# Patient Record
Sex: MALE | Race: WHITE | NOT HISPANIC OR LATINO | Employment: PART TIME | URBAN - METROPOLITAN AREA
[De-identification: names, ages, dates, MRNs, and addresses within clinical notes are randomized per-mention and may not be internally consistent; named-entity substitution may affect disease eponyms.]

---

## 2018-08-03 ENCOUNTER — TELEPHONE (OUTPATIENT)
Dept: PRIMARY CARE CLINIC | Facility: CLINIC | Age: 28
End: 2018-08-03

## 2018-08-03 ENCOUNTER — LAB VISIT (OUTPATIENT)
Dept: LAB | Facility: HOSPITAL | Age: 28
End: 2018-08-03
Attending: INTERNAL MEDICINE
Payer: COMMERCIAL

## 2018-08-03 ENCOUNTER — OFFICE VISIT (OUTPATIENT)
Dept: PRIMARY CARE CLINIC | Facility: CLINIC | Age: 28
End: 2018-08-03
Payer: COMMERCIAL

## 2018-08-03 VITALS
BODY MASS INDEX: 19.88 KG/M2 | SYSTOLIC BLOOD PRESSURE: 132 MMHG | WEIGHT: 150 LBS | OXYGEN SATURATION: 99 % | HEART RATE: 77 BPM | HEIGHT: 73 IN | DIASTOLIC BLOOD PRESSURE: 83 MMHG | TEMPERATURE: 98 F

## 2018-08-03 DIAGNOSIS — Z11.3 SCREEN FOR STD (SEXUALLY TRANSMITTED DISEASE): ICD-10-CM

## 2018-08-03 DIAGNOSIS — H69.03 PATULOUS EUSTACHIAN TUBE OF BOTH EARS: ICD-10-CM

## 2018-08-03 DIAGNOSIS — Z23 NEED FOR TDAP VACCINATION: ICD-10-CM

## 2018-08-03 DIAGNOSIS — Q67.6 PECTUS EXCAVATUM: ICD-10-CM

## 2018-08-03 DIAGNOSIS — R07.89 ATYPICAL CHEST PAIN: ICD-10-CM

## 2018-08-03 DIAGNOSIS — Z00.00 ENCOUNTER FOR WELLNESS EXAMINATION IN ADULT: ICD-10-CM

## 2018-08-03 DIAGNOSIS — Z00.00 ENCOUNTER FOR WELLNESS EXAMINATION IN ADULT: Primary | ICD-10-CM

## 2018-08-03 LAB
25(OH)D3+25(OH)D2 SERPL-MCNC: 37 NG/ML
ALBUMIN SERPL BCP-MCNC: 4.2 G/DL
ALP SERPL-CCNC: 73 U/L
ALT SERPL W/O P-5'-P-CCNC: 14 U/L
ANION GAP SERPL CALC-SCNC: 8 MMOL/L
AST SERPL-CCNC: 16 U/L
BASOPHILS # BLD AUTO: 0.05 K/UL
BASOPHILS NFR BLD: 0.9 %
BILIRUB SERPL-MCNC: 1.2 MG/DL
BUN SERPL-MCNC: 14 MG/DL
CALCIUM SERPL-MCNC: 10 MG/DL
CHLORIDE SERPL-SCNC: 102 MMOL/L
CO2 SERPL-SCNC: 29 MMOL/L
CREAT SERPL-MCNC: 1 MG/DL
D DIMER PPP IA.FEU-MCNC: <0.19 MG/L FEU
DIFFERENTIAL METHOD: ABNORMAL
EOSINOPHIL # BLD AUTO: 0.1 K/UL
EOSINOPHIL NFR BLD: 2.4 %
ERYTHROCYTE [DISTWIDTH] IN BLOOD BY AUTOMATED COUNT: 11.7 %
EST. GFR  (AFRICAN AMERICAN): >60 ML/MIN/1.73 M^2
EST. GFR  (NON AFRICAN AMERICAN): >60 ML/MIN/1.73 M^2
GLUCOSE SERPL-MCNC: 73 MG/DL
HCT VFR BLD AUTO: 46 %
HGB BLD-MCNC: 15.6 G/DL
IMM GRANULOCYTES # BLD AUTO: 0.04 K/UL
IMM GRANULOCYTES NFR BLD AUTO: 0.7 %
LYMPHOCYTES # BLD AUTO: 2 K/UL
LYMPHOCYTES NFR BLD: 34.1 %
MCH RBC QN AUTO: 30 PG
MCHC RBC AUTO-ENTMCNC: 33.9 G/DL
MCV RBC AUTO: 89 FL
MONOCYTES # BLD AUTO: 0.7 K/UL
MONOCYTES NFR BLD: 11.4 %
NEUTROPHILS # BLD AUTO: 3 K/UL
NEUTROPHILS NFR BLD: 50.5 %
NRBC BLD-RTO: 0 /100 WBC
PLATELET # BLD AUTO: 330 K/UL
PMV BLD AUTO: 10.9 FL
POTASSIUM SERPL-SCNC: 3.5 MMOL/L
PROT SERPL-MCNC: 7.6 G/DL
RBC # BLD AUTO: 5.2 M/UL
SODIUM SERPL-SCNC: 139 MMOL/L
TSH SERPL DL<=0.005 MIU/L-ACNC: 2.17 UIU/ML
WBC # BLD AUTO: 5.87 K/UL

## 2018-08-03 PROCEDURE — 82306 VITAMIN D 25 HYDROXY: CPT

## 2018-08-03 PROCEDURE — 3008F BODY MASS INDEX DOCD: CPT | Mod: CPTII,S$GLB,, | Performed by: INTERNAL MEDICINE

## 2018-08-03 PROCEDURE — 99203 OFFICE O/P NEW LOW 30 MIN: CPT | Mod: 25,S$GLB,, | Performed by: INTERNAL MEDICINE

## 2018-08-03 PROCEDURE — 36415 COLL VENOUS BLD VENIPUNCTURE: CPT | Mod: PN

## 2018-08-03 PROCEDURE — 84443 ASSAY THYROID STIM HORMONE: CPT

## 2018-08-03 PROCEDURE — 86592 SYPHILIS TEST NON-TREP QUAL: CPT

## 2018-08-03 PROCEDURE — 85025 COMPLETE CBC W/AUTO DIFF WBC: CPT

## 2018-08-03 PROCEDURE — 99999 PR PBB SHADOW E&M-NEW PATIENT-LVL IV: CPT | Mod: PBBFAC,,, | Performed by: INTERNAL MEDICINE

## 2018-08-03 PROCEDURE — 86703 HIV-1/HIV-2 1 RESULT ANTBDY: CPT

## 2018-08-03 PROCEDURE — 90715 TDAP VACCINE 7 YRS/> IM: CPT | Mod: S$GLB,,, | Performed by: INTERNAL MEDICINE

## 2018-08-03 PROCEDURE — 85379 FIBRIN DEGRADATION QUANT: CPT

## 2018-08-03 PROCEDURE — 90471 IMMUNIZATION ADMIN: CPT | Mod: S$GLB,,, | Performed by: INTERNAL MEDICINE

## 2018-08-03 PROCEDURE — 80053 COMPREHEN METABOLIC PANEL: CPT

## 2018-08-03 PROCEDURE — 93005 ELECTROCARDIOGRAM TRACING: CPT | Mod: S$GLB,,, | Performed by: INTERNAL MEDICINE

## 2018-08-03 PROCEDURE — 93010 ELECTROCARDIOGRAM REPORT: CPT | Mod: ,,, | Performed by: INTERNAL MEDICINE

## 2018-08-03 NOTE — PROGRESS NOTES
Subjective:       Patient ID: Rogers Walters is a 28 y.o. male with a PMH significant for Patulous Eustachian Tube Dysfunction s/p bilateral eustachian tub placement in 6/2013 presents today to establish care.    Chief Complaint: Establish Care and Chest Pain    HPI   Having left upper chest pain described as sharp and intermittent.  Last was 1 week ago and before that was 6 months.  Initially had it 1 1/2 years ago.  No associated with exertion.  Not positional.  Lasted for 5 minutes.  Movement and inspiration make it work.  No orthopnea or PND.  No BELTRAN.  No associated N/V.  No LE edema.  No palpitations.  Patient denies f/c, n/v/d.  No chest pain or SOB.  No abdominal pain or dysuria.  No headaches or change in vision.  No dizziness.  No significant  weight gain or weight loss.  Remaining ROS negative.    Review of Systems   Constitutional: Negative for appetite change, diaphoresis, fatigue and unexpected weight change.   HENT: Negative for congestion, ear pain, hearing loss, rhinorrhea, sinus pressure, sore throat, trouble swallowing and voice change.    Eyes: Negative for photophobia, pain and visual disturbance.   Respiratory: Negative for cough, chest tightness, shortness of breath and wheezing.    Cardiovascular: Positive for chest pain. Negative for palpitations and leg swelling.   Gastrointestinal: Negative for abdominal pain, blood in stool, constipation, diarrhea, nausea and vomiting.   Endocrine: Negative for cold intolerance, heat intolerance, polydipsia, polyphagia and polyuria.   Genitourinary: Negative for decreased urine volume, difficulty urinating, discharge, dysuria, flank pain, hematuria, scrotal swelling, testicular pain and urgency.   Musculoskeletal: Negative for arthralgias, back pain, myalgias and neck pain.   Skin: Negative for rash.   Neurological: Negative for dizziness, tremors, syncope, weakness, numbness and headaches.   Hematological: Does not bruise/bleed easily.    Psychiatric/Behavioral: Negative for agitation, confusion and sleep disturbance. The patient is not nervous/anxious.        Objective:      Physical Exam   Constitutional: He is oriented to person, place, and time. He appears well-developed and well-nourished.   HENT:   Head: Normocephalic.   Nose: Nose normal.   Mouth/Throat: Oropharynx is clear and moist.   Eyes: Conjunctivae and EOM are normal. Pupils are equal, round, and reactive to light. Right eye exhibits no discharge. Left eye exhibits no discharge. No scleral icterus.   Neck: Normal range of motion. Neck supple. No thyromegaly present.   Cardiovascular: Normal rate, regular rhythm, normal heart sounds and intact distal pulses.  Exam reveals no gallop and no friction rub.    No murmur heard.  Pulmonary/Chest: Effort normal and breath sounds normal. No respiratory distress. He has no wheezes. He has no rales.   Abdominal: Soft. Bowel sounds are normal. He exhibits no distension. There is no tenderness. There is no rebound and no guarding.   Genitourinary: Rectum normal and penis normal.   Musculoskeletal: Normal range of motion. He exhibits no edema or deformity.   Lymphadenopathy:     He has no cervical adenopathy.   Neurological: He is alert and oriented to person, place, and time. No cranial nerve deficit or sensory deficit.   Skin: Skin is warm and dry. No rash noted. No erythema.   Psychiatric: He has a normal mood and affect. His behavior is normal. Thought content normal.       Assessment:       1. Encounter for wellness examination in adult    2. Patulous eustachian tube of both ears    3. Need for Tdap vaccination    4. Atypical chest pain    5. Pectus excavatum    6. Screen for STD (sexually transmitted disease)        Plan:    -Adult Wellness Exam - Patient overall stable with good BP today.  Will order routine fasting labs today.  Offered STD screening - will order today.    -Cards - Atypical Chest Pain - has Pectus Excavatum and unclear if  this is contributing to his symptoms.  VS are stable.  Cardiac exam normal.  Will check EKG, TTE, CXR, d-dimers (Uber ).    -ENT - Patulous Eustachian Tube Dysfunction s/p bilateral eustachian tub placement in 6/2013.  Last surgery done 10/2017.  No tubes now.    -HCM - Discussed Flu and Tdap (today) Vaccines.       -Follow up in 4 weeks to review testing.

## 2018-08-03 NOTE — PATIENT INSTRUCTIONS
Your exam was overall normal today.  Your blood pressure was good.  Your chest pain is Atypical, but given the fact you have Pectus Excavatum, I will order a few additional tests.  I will check an EKG today and Chest x-ray.  I will order an Echocardiogram to look at your heart function.    I will order routine fasting labs today - at least 6-8 hours of fasting.  You will need to return for the fasting lipids part of the tests.  We will do and STD screen for HIV 1/2, Syphilis, Gonorrhea and Chlamydia.  We will give you the Tdap vaccine today.  Return in 4 weeks - sooner if needed.  Please come at least 15-20 minutes before your scheduled appointment time.

## 2018-08-03 NOTE — PROGRESS NOTES
"Patient was given vaccine information sheet for the Tdap immunization. The area of injection was palpated using the acromion process as a landmark. This area was cleaned with alcohol. Using a 25g 1" safety needle, 0.5mL of the vaccine was placed into the Left Deltoid muscle. The injection site was dressed with a bandage. Patient experienced no complications and was discharged in stable condition. Tdap Lot: [id number] Exp: 2/6/20    "

## 2018-08-03 NOTE — TELEPHONE ENCOUNTER
lmovm letting pt know xray was normal call back with questions.    ----- Message from Higinio Curry MD sent at 8/3/2018  3:05 PM CDT -----  Please let patient know that his chest x-ray was normal.

## 2018-08-04 LAB — RPR SER QL: NORMAL

## 2018-08-06 ENCOUNTER — TELEPHONE (OUTPATIENT)
Dept: PRIMARY CARE CLINIC | Facility: CLINIC | Age: 28
End: 2018-08-06

## 2018-08-06 ENCOUNTER — HOSPITAL ENCOUNTER (OUTPATIENT)
Dept: CARDIOLOGY | Facility: OTHER | Age: 28
Discharge: HOME | End: 2018-08-06
Attending: INTERNAL MEDICINE
Payer: COMMERCIAL

## 2018-08-06 DIAGNOSIS — Q67.6 PECTUS EXCAVATUM: ICD-10-CM

## 2018-08-06 LAB — HIV 1+2 AB+HIV1 P24 AG SERPL QL IA: NEGATIVE

## 2018-08-06 PROCEDURE — 93306 TTE W/DOPPLER COMPLETE: CPT

## 2018-08-06 NOTE — TELEPHONE ENCOUNTER
Called pt, could not leave VM because mailbox is full    ----- Message from Higinio Curry MD sent at 8/6/2018 12:45 PM CDT -----  Please let patient know that his HIV test was negative

## 2018-08-07 ENCOUNTER — TELEPHONE (OUTPATIENT)
Dept: PRIMARY CARE CLINIC | Facility: CLINIC | Age: 28
End: 2018-08-07

## 2018-08-07 LAB
DIASTOLIC DYSFUNCTION: NO
ESTIMATED PA SYSTOLIC PRESSURE: 14.44
RETIRED EF AND QEF - SEE NOTES: 67 (ref 55–65)

## 2018-08-07 NOTE — TELEPHONE ENCOUNTER
----- Message from Higinio Curry MD sent at 8/7/2018  9:55 AM CDT -----  Please let patient know that his Echocardiogram was normal.

## 2018-08-17 PROBLEM — G50.1 ATYPICAL FACIAL PAIN: Status: ACTIVE | Noted: 2018-08-17

## 2018-08-17 PROBLEM — M26.609 TEMPOROMANDIBULAR JOINT DISORDER: Status: ACTIVE | Noted: 2018-08-17

## 2018-08-17 PROBLEM — H69.00 PATULOUS EUSTACHIAN TUBE: Status: ACTIVE | Noted: 2018-08-17

## 2018-08-30 NOTE — PROGRESS NOTES
Subjective:       Patient ID: Rogers Walters is a 28 y.o. male with a PMH significant for Patulous Eustachian Tube Dysfunction s/p bilateral eustachian tub placement in 6/2013 presents today to establish care.    Chief Complaint: Follow-up    HPI  Patient overall stable today.  We discussed recent labs and cardiac work-up.  No significant pain since last visit.  Patient denies f/c, n/v/d.  No chest pain or SOB.  No abdominal pain or dysuria.  No headaches or change in vision.  No dizziness.  No significant  weight gain or weight loss.  Remaining ROS negative.    Review of Systems   Constitutional: Negative for appetite change, diaphoresis, fatigue and unexpected weight change.   HENT: Negative for congestion, ear pain, hearing loss, rhinorrhea, sinus pressure, sore throat, trouble swallowing and voice change.    Eyes: Negative for photophobia, pain and visual disturbance.   Respiratory: Negative for cough, chest tightness, shortness of breath and wheezing.    Cardiovascular: Negative for chest pain, palpitations and leg swelling.   Gastrointestinal: Negative for abdominal pain, blood in stool, constipation, diarrhea, nausea and vomiting.   Endocrine: Negative for cold intolerance, heat intolerance, polydipsia, polyphagia and polyuria.   Genitourinary: Negative for decreased urine volume, difficulty urinating, discharge, dysuria, flank pain, hematuria, scrotal swelling, testicular pain and urgency.   Musculoskeletal: Negative for arthralgias, back pain, myalgias and neck pain.   Skin: Negative for rash.   Neurological: Negative for dizziness, tremors, syncope, weakness, numbness and headaches.   Hematological: Does not bruise/bleed easily.   Psychiatric/Behavioral: Negative for agitation, confusion and sleep disturbance. The patient is not nervous/anxious.        Objective:      Physical Exam   Constitutional: He is oriented to person, place, and time. He appears well-developed and well-nourished.   HENT:   Head:  Normocephalic.   Nose: Nose normal.   Mouth/Throat: Oropharynx is clear and moist.   Eyes: Conjunctivae and EOM are normal. Pupils are equal, round, and reactive to light. Right eye exhibits no discharge. Left eye exhibits no discharge. No scleral icterus.   Neck: Normal range of motion. Neck supple. No thyromegaly present.   Cardiovascular: Normal rate, regular rhythm, normal heart sounds and intact distal pulses. Exam reveals no gallop and no friction rub.   No murmur heard.  Pulmonary/Chest: Effort normal and breath sounds normal. No respiratory distress. He has no wheezes. He has no rales.   Abdominal: Soft. Bowel sounds are normal. He exhibits no distension. There is no tenderness. There is no rebound and no guarding.   Genitourinary:   Genitourinary Comments: deferred   Musculoskeletal: Normal range of motion. He exhibits no edema or deformity.   Lymphadenopathy:     He has no cervical adenopathy.   Neurological: He is alert and oriented to person, place, and time. No cranial nerve deficit or sensory deficit.   Skin: Skin is warm and dry. No rash noted. No erythema.   Psychiatric: He has a normal mood and affect. His behavior is normal. Thought content normal.       Assessment:       1. Atypical chest pain    2. Pectus excavatum        Plan:    -Today's Visit - patient overall stable.    -Cards - Atypical Chest Pain last visit - has Pectus Excavatum and was unclear if this was contributing to his symptoms.  VS were stable.  Cardiac exam was normal.  No FH of early CAD.   EKG with NSR and possible LAE.  TTE was normal.  CXR normal.  D-dimers (Uber ) negative.   Lipids not yet done - check when fasting.    -ENT - Patulous Eustachian Tube Dysfunction s/p bilateral eustachian tub placement in 6/2013.  Last surgery done 10/2017.  No tubes now.    -HCM - Discussed Flu (his dad is an Ophthalmologist and will give him the Flu vaccine) and Tdap (8/3/2018) Vaccines.   STD screen in 8/2018 negative for HIV 1/2,  Syphilis, GC/Chlamydia.  Vitamin D level normal in 8/2018.  TSH normal in 8/2018.     -Follow up as needed

## 2018-08-31 ENCOUNTER — OFFICE VISIT (OUTPATIENT)
Dept: PRIMARY CARE CLINIC | Facility: CLINIC | Age: 28
End: 2018-08-31
Payer: COMMERCIAL

## 2018-08-31 VITALS
SYSTOLIC BLOOD PRESSURE: 112 MMHG | HEIGHT: 73 IN | TEMPERATURE: 97 F | BODY MASS INDEX: 19.93 KG/M2 | WEIGHT: 150.38 LBS | HEART RATE: 79 BPM | DIASTOLIC BLOOD PRESSURE: 72 MMHG | OXYGEN SATURATION: 99 %

## 2018-08-31 DIAGNOSIS — Q67.6 PECTUS EXCAVATUM: ICD-10-CM

## 2018-08-31 DIAGNOSIS — R07.89 ATYPICAL CHEST PAIN: Primary | ICD-10-CM

## 2018-08-31 PROCEDURE — 99999 PR PBB SHADOW E&M-EST. PATIENT-LVL III: CPT | Mod: PBBFAC,,, | Performed by: INTERNAL MEDICINE

## 2018-08-31 PROCEDURE — 3008F BODY MASS INDEX DOCD: CPT | Mod: CPTII,S$GLB,, | Performed by: INTERNAL MEDICINE

## 2018-08-31 PROCEDURE — 99213 OFFICE O/P EST LOW 20 MIN: CPT | Mod: S$GLB,,, | Performed by: INTERNAL MEDICINE

## 2018-08-31 NOTE — PATIENT INSTRUCTIONS
Your exam was overall normal today.  Your blood pressure was good.  I will order routine fasting labs today - at least 6-8 hours of fasting.  We discussed the Flu and Tdap vaccinations.  Return in 1 year - sooner if needed.  Please come at least 15-20 minutes before your scheduled appointment time.

## 2019-10-29 DIAGNOSIS — J32.1 FRONTAL SINUSITIS: ICD-10-CM

## 2019-10-29 DIAGNOSIS — J32.3 CHRONIC SPHENOIDAL SINUSITIS: Primary | ICD-10-CM

## 2019-10-29 DIAGNOSIS — H69.03 PATULOUS EUSTACHIAN TUBE, BILATERAL: ICD-10-CM

## 2019-10-29 DIAGNOSIS — J32.2 CHRONIC ETHMOIDAL SINUSITIS: ICD-10-CM

## 2019-10-29 DIAGNOSIS — H93.13 TINNITUS, BILATERAL: ICD-10-CM

## 2019-10-29 DIAGNOSIS — H69.83 OTHER SPECIFIED DISORDERS OF EUSTACHIAN TUBE, BILATERAL: ICD-10-CM

## 2019-10-29 DIAGNOSIS — J32.0 CHRONIC MAXILLARY SINUSITIS: ICD-10-CM

## 2019-11-13 ENCOUNTER — HOSPITAL ENCOUNTER (OUTPATIENT)
Dept: RADIOLOGY | Facility: HOSPITAL | Age: 29
Discharge: HOME | End: 2019-11-13
Attending: OTOLARYNGOLOGY
Payer: COMMERCIAL

## 2019-11-13 DIAGNOSIS — J32.0 CHRONIC MAXILLARY SINUSITIS: ICD-10-CM

## 2019-11-13 DIAGNOSIS — H69.03 PATULOUS EUSTACHIAN TUBE, BILATERAL: ICD-10-CM

## 2019-11-13 DIAGNOSIS — J32.3 CHRONIC SPHENOIDAL SINUSITIS: ICD-10-CM

## 2019-11-13 DIAGNOSIS — J32.1 FRONTAL SINUSITIS: ICD-10-CM

## 2019-11-13 DIAGNOSIS — J32.2 CHRONIC ETHMOIDAL SINUSITIS: ICD-10-CM

## 2019-11-13 DIAGNOSIS — H93.13 TINNITUS, BILATERAL: ICD-10-CM

## 2019-11-13 DIAGNOSIS — H69.83 OTHER SPECIFIED DISORDERS OF EUSTACHIAN TUBE, BILATERAL: ICD-10-CM

## 2019-11-13 PROCEDURE — 70480 CT ORBIT/EAR/FOSSA W/O DYE: CPT | Mod: TC

## 2019-11-13 PROCEDURE — 70480 CT ORBIT/EAR/FOSSA W/O DYE: CPT | Mod: 26,,, | Performed by: RADIOLOGY

## 2019-11-13 PROCEDURE — 70486 CT MAXILLOFACIAL W/O DYE: CPT | Mod: 26,,, | Performed by: RADIOLOGY

## 2019-11-13 PROCEDURE — 70480 CT ORBITS SELLA POST FOSSA WITHOUT CONT: ICD-10-PCS | Mod: 26,,, | Performed by: RADIOLOGY

## 2019-11-13 PROCEDURE — 70486 CT SINUSES WITHOUT CONTRAST: ICD-10-PCS | Mod: 26,,, | Performed by: RADIOLOGY

## 2019-11-13 PROCEDURE — 70486 CT MAXILLOFACIAL W/O DYE: CPT | Mod: TC

## 2020-02-21 ENCOUNTER — TELEPHONE (OUTPATIENT)
Dept: INTERNAL MEDICINE | Facility: CLINIC | Age: 30
End: 2020-02-21

## 2020-02-21 NOTE — TELEPHONE ENCOUNTER
Attempted to call patient to re-establish care with a new PCP but mailbox is full and can't accept new messages.

## 2020-05-21 ENCOUNTER — TELEPHONE (OUTPATIENT)
Dept: OTOLARYNGOLOGY | Facility: CLINIC | Age: 30
End: 2020-05-21

## 2020-05-25 ENCOUNTER — TELEPHONE (OUTPATIENT)
Dept: OTOLARYNGOLOGY | Facility: CLINIC | Age: 30
End: 2020-05-25

## 2020-05-26 ENCOUNTER — TELEPHONE (OUTPATIENT)
Dept: OTOLARYNGOLOGY | Facility: CLINIC | Age: 30
End: 2020-05-26

## 2020-07-02 ENCOUNTER — TELEPHONE (OUTPATIENT)
Dept: INTERNAL MEDICINE | Facility: CLINIC | Age: 30
End: 2020-07-02

## 2020-07-28 ENCOUNTER — CLINICAL SUPPORT (OUTPATIENT)
Dept: AUDIOLOGY | Facility: CLINIC | Age: 30
End: 2020-07-28
Payer: COMMERCIAL

## 2020-07-28 ENCOUNTER — OFFICE VISIT (OUTPATIENT)
Dept: OTOLARYNGOLOGY | Facility: CLINIC | Age: 30
End: 2020-07-28
Payer: COMMERCIAL

## 2020-07-28 VITALS
WEIGHT: 144.19 LBS | SYSTOLIC BLOOD PRESSURE: 135 MMHG | DIASTOLIC BLOOD PRESSURE: 87 MMHG | BODY MASS INDEX: 19.02 KG/M2 | HEART RATE: 67 BPM

## 2020-07-28 DIAGNOSIS — M26.609 TEMPOROMANDIBULAR JOINT DISORDER: ICD-10-CM

## 2020-07-28 DIAGNOSIS — H69.03 PATULOUS EUSTACHIAN TUBE OF BOTH EARS: Primary | ICD-10-CM

## 2020-07-28 DIAGNOSIS — H69.00 PATULOUS EUSTACHIAN TUBE, UNSPECIFIED LATERALITY: ICD-10-CM

## 2020-07-28 DIAGNOSIS — H69.92 DYSFUNCTION OF LEFT EUSTACHIAN TUBE: Primary | ICD-10-CM

## 2020-07-28 DIAGNOSIS — H69.00 PATULOUS EUSTACHIAN TUBE, UNSPECIFIED LATERALITY: Primary | ICD-10-CM

## 2020-07-28 PROCEDURE — 92567 PR TYMPA2METRY: ICD-10-PCS | Mod: S$GLB,,, | Performed by: AUDIOLOGIST

## 2020-07-28 PROCEDURE — 92567 TYMPANOMETRY: CPT | Mod: S$GLB,,, | Performed by: AUDIOLOGIST

## 2020-07-28 PROCEDURE — 99204 OFFICE O/P NEW MOD 45 MIN: CPT | Mod: S$GLB,,, | Performed by: OTOLARYNGOLOGY

## 2020-07-28 PROCEDURE — 99999 PR PBB SHADOW E&M-EST. PATIENT-LVL III: CPT | Mod: PBBFAC,,, | Performed by: OTOLARYNGOLOGY

## 2020-07-28 PROCEDURE — 99999 PR PBB SHADOW E&M-EST. PATIENT-LVL II: ICD-10-PCS | Mod: PBBFAC,,, | Performed by: AUDIOLOGIST

## 2020-07-28 PROCEDURE — 99204 PR OFFICE/OUTPT VISIT, NEW, LEVL IV, 45-59 MIN: ICD-10-PCS | Mod: S$GLB,,, | Performed by: OTOLARYNGOLOGY

## 2020-07-28 PROCEDURE — 99999 PR PBB SHADOW E&M-EST. PATIENT-LVL III: ICD-10-PCS | Mod: PBBFAC,,, | Performed by: OTOLARYNGOLOGY

## 2020-07-28 PROCEDURE — 99999 PR PBB SHADOW E&M-EST. PATIENT-LVL II: CPT | Mod: PBBFAC,,, | Performed by: AUDIOLOGIST

## 2020-07-28 PROCEDURE — 3008F PR BODY MASS INDEX (BMI) DOCUMENTED: ICD-10-PCS | Mod: CPTII,S$GLB,, | Performed by: OTOLARYNGOLOGY

## 2020-07-28 PROCEDURE — 3008F BODY MASS INDEX DOCD: CPT | Mod: CPTII,S$GLB,, | Performed by: OTOLARYNGOLOGY

## 2020-07-28 RX ORDER — PREGABALIN 100 MG/1
100 CAPSULE ORAL 2 TIMES DAILY
COMMUNITY

## 2020-07-29 NOTE — PROGRESS NOTES
Subjective:      Rogers Walters is a 30 y.o. male who was self-referred for patulous Eustachian tube dysfunction.    He relates an exhaustive history of classical patulous ETD symptoms that began nearly 10 years ago of unclear origin.  He notes that this has always been worse on the left side, though over time the right ear has become variable involved.  He describes initial treatment by Dr. Brandon Millan, who performed a cheryl insertion in both Eustachian tubes over 8 years ago which was temporarily successful but marred by persistent aural fullness.  After treatment with myringotomy the shims were eventually removed.  He then came under the care of Dr. Roby Short in [place], who again performed a cheryl procedure with some adjunctive manipulation in the left ear, but the beneficial results again were transient.  Along the way he has tried topical medical treatments with Patulend and premarin drops.  Most recently he has been under the care of Dr. Kong Sales in [place], who about 2 years ago performed a cartilage graft insertion in the left peritubular space, which helped somewhat.  This seemed to have the effects of malpositioning and so was treated with attempted crushing by balloon dilation, but the symptoms persisted.  He also underwent reportedly 3 rounds of botox injection in the left tensor veli palatini in an attempt to debulk the muscle by chemical denervation.  He most recently had an examination under anesthesia by Dr. Sales about 1 month ago, which did not entail intervention.  He has reportedly proposed an endoscopic procedure to adjust the positioning of the cartilage graft, but is seeking a second opinion close to home due to the pandemic.    He continues to describe variable symptoms, which are unpredictable but seem to be more dynamic in nature.  He describes autophony, audible respirations, and aural fullness, greater on the left side.  He notes sporadic fluctuations in the function of the  left-sided peritubular muscles, which he associates temporally with muscle tension in his posterior neck.  He denies TMJ tenderness, clicking, trismus or grinding.  He denies objective hearing loss or vertigo.  He denies sinonasal symptoms or hyposmia.  He does note that over the past 1-2 months his aural symptoms are showing a gradual trend toward improvement, though this is subtle.    Current sinonasal medications include none at present.  The last course of antibiotics was a long time ago.  He does not regularly use nasal decongestant sprays.    He does not recall previously having allergy testing.    He denies a history of asthma.    He denies a history of reflux symptoms.  He has not previously had an EGD.    He denies a diagnosis of obstructive sleep apnea.     He has previously had sinonasal surgery as above.  He has had a tonsillectomy about 5 years ago, which did not help the aural symptoms.    He does not recall a prior history of nasal trauma other than the sinonasal surgery.        Past Medical History  He has no past medical history on file.    Past Surgical History  He has a past surgical history that includes Inner ear surgery and Inner ear surgery.    Family History  His family history includes No Known Problems in his brother, father, mother, and sister.    Social History  He reports that he has never smoked. He has never used smokeless tobacco. He reports current alcohol use. He reports current drug use. Drug: Marijuana.    Allergies  He has No Known Allergies.    Medications   He has a current medication list which includes the following prescription(s): pregabalin.    Review of Systems  Review of Systems   Constitutional: Negative for fatigue, fever and unexpected weight change.   HENT: Negative for congestion, dental problem, ear discharge, ear pain, facial swelling, hearing loss, hoarse voice, nosebleeds, postnasal drip, rhinorrhea, sinus pressure, sore throat, tinnitus, trouble swallowing and  voice change.    Eyes: Negative for photophobia, discharge, itching and visual disturbance.   Respiratory: Negative for apnea, cough, shortness of breath and wheezing.    Cardiovascular: Negative for chest pain and palpitations.   Gastrointestinal: Negative for abdominal pain, nausea and vomiting.   Endocrine: Negative for cold intolerance and heat intolerance.   Genitourinary: Negative for difficulty urinating.   Musculoskeletal: Negative for arthralgias, back pain, myalgias and neck pain.   Skin: Negative for rash.   Allergic/Immunologic: Negative for environmental allergies and food allergies.   Neurological: Negative for dizziness, seizures, syncope, weakness and headaches.   Hematological: Negative for adenopathy. Does not bruise/bleed easily.   Psychiatric/Behavioral: Negative for decreased concentration, dysphoric mood and sleep disturbance. The patient is not nervous/anxious.           Objective:     /87   Pulse 67   Wt 65.4 kg (144 lb 2.9 oz)   BMI 19.02 kg/m²        Constitutional:   He appears well-developed. He is cooperative. Normal speech.  No hoarse voice.      Head:  Normocephalic. Salivary glands normal.  Facial strength is normal.      Ears:    Right Ear: No drainage or tenderness. Tympanic membrane is not perforated. Tympanic membrane mobility is normal. No middle ear effusion. No decreased hearing is noted.   Left Ear: No drainage or tenderness. Tympanic membrane is not perforated. Tympanic membrane mobility is normal.  No middle ear effusion. No decreased hearing is noted.   Positive ipsilateral breathing test with visible TM excursion bilaterally    Nose:  No mucosal edema, rhinorrhea, septal deviation or polyps. No epistaxis. Turbinates normal, no turbinate masses and no turbinate hypertrophy.  Right sinus exhibits no maxillary sinus tenderness and no frontal sinus tenderness. Left sinus exhibits no maxillary sinus tenderness and no frontal sinus tenderness.      Mouth/Throat  Oropharynx clear and moist without lesions or asymmetry and normal uvula midline. He does not have dentures. Normal dentition. No oral lesions or mucous membrane lesions. No oropharyngeal exudate or posterior oropharyngeal erythema. Mirror exam not performed due to patient tolerance.  Mirror exam not performed due to patient tolerance.      Neck:  Neck normal without thyromegaly masses, asymmetry, normal tracheal structure, crepitus, and tenderness, thyroid normal, trachea normal and no adenopathy. Normal range of motion present.     He has no cervical adenopathy.     Cardiovascular:   Regular rhythm.      Pulmonary/Chest:   Effort normal.     Psychiatric:   He has a normal mood and affect. His speech is normal and behavior is normal.     Neurological:   No cranial nerve deficit.     Skin:   No rash noted.       Procedure    None        Data Reviewed    WBC (K/uL)   Date Value   08/03/2018 5.87     Eosinophil% (%)   Date Value   08/03/2018 2.4     Eos # (K/uL)   Date Value   08/03/2018 0.1     Platelets (K/uL)   Date Value   08/03/2018 330     Glucose (mg/dL)   Date Value   08/03/2018 73     No results found for: IGE    No sinus imaging available.     I independently reviewed the tracings of the tympanogram performed today.  I reviewed the audiogram with the patient as well.  Pertinent findings include a normal study with near-zero tracings.       Assessment:     1. Patulous eustachian tube of both ears         Plan:     I had a long discussion with the patient regarding his condition and the further workup and management options.  Given his overall trend of recent improvement, I do not advise manipulation of the Eustachian tube area.  We will plan for nasal endoscopy in 1 to 2 months to further assess, should his symptom course change.  We discussed the reality that he has already exhausted the short list of available (albeit experimental) treatments for this difficult condition, at the hands of  some of the top experts in this field.  I will remain alert for new possibilities for his care moving forward and to assist as I am able.    Follow up if symptoms worsen or fail to improve.    I spent 45 minutes face-to-face with Rogers Walters today; greater than 50% was spent in counseling regarding his diagnosis and management, as detailed above.

## 2022-11-15 ENCOUNTER — TELEPHONE (OUTPATIENT)
Dept: OTOLARYNGOLOGY | Facility: CLINIC | Age: 32
End: 2022-11-15
Payer: COMMERCIAL

## 2022-11-15 NOTE — TELEPHONE ENCOUNTER
----- Message from Lana Alvin sent at 11/15/2022  2:06 PM CST -----  Contact: self  Type: Needs Medical Advice    Who Called:  patient  Symptoms (please be specific):  est care/  Eustachian tube dysfunction    Best Call Back Number: [phone]   Additional Information: The pt stated that he would like to sck an appt ASAP. Please call pt back and advice. Thanks.

## 2022-11-22 ENCOUNTER — OFFICE VISIT (OUTPATIENT)
Dept: OTOLARYNGOLOGY | Facility: CLINIC | Age: 32
End: 2022-11-22
Payer: COMMERCIAL

## 2022-11-22 VITALS — WEIGHT: 141.56 LBS | HEIGHT: 73 IN | BODY MASS INDEX: 18.76 KG/M2

## 2022-11-22 DIAGNOSIS — H69.03 PATULOUS EUSTACHIAN TUBE OF BOTH EARS: Primary | ICD-10-CM

## 2022-11-22 PROCEDURE — 31231 NASAL ENDOSCOPY DX: CPT | Mod: S$GLB,,, | Performed by: OTOLARYNGOLOGY

## 2022-11-22 PROCEDURE — 99213 OFFICE O/P EST LOW 20 MIN: CPT | Mod: 25,S$GLB,, | Performed by: OTOLARYNGOLOGY

## 2022-11-22 PROCEDURE — 31231 PR NASAL ENDOSCOPY, DX: ICD-10-PCS | Mod: S$GLB,,, | Performed by: OTOLARYNGOLOGY

## 2022-11-22 PROCEDURE — 3008F BODY MASS INDEX DOCD: CPT | Mod: CPTII,S$GLB,, | Performed by: OTOLARYNGOLOGY

## 2022-11-22 PROCEDURE — 3008F PR BODY MASS INDEX (BMI) DOCUMENTED: ICD-10-PCS | Mod: CPTII,S$GLB,, | Performed by: OTOLARYNGOLOGY

## 2022-11-22 PROCEDURE — 99213 PR OFFICE/OUTPT VISIT, EST, LEVL III, 20-29 MIN: ICD-10-PCS | Mod: 25,S$GLB,, | Performed by: OTOLARYNGOLOGY

## 2022-11-22 PROCEDURE — 1160F PR REVIEW ALL MEDS BY PRESCRIBER/CLIN PHARMACIST DOCUMENTED: ICD-10-PCS | Mod: CPTII,S$GLB,, | Performed by: OTOLARYNGOLOGY

## 2022-11-22 PROCEDURE — 1159F PR MEDICATION LIST DOCUMENTED IN MEDICAL RECORD: ICD-10-PCS | Mod: CPTII,S$GLB,, | Performed by: OTOLARYNGOLOGY

## 2022-11-22 PROCEDURE — 1159F MED LIST DOCD IN RCRD: CPT | Mod: CPTII,S$GLB,, | Performed by: OTOLARYNGOLOGY

## 2022-11-22 PROCEDURE — 99999 PR PBB SHADOW E&M-EST. PATIENT-LVL III: CPT | Mod: PBBFAC,,, | Performed by: OTOLARYNGOLOGY

## 2022-11-22 PROCEDURE — 99999 PR PBB SHADOW E&M-EST. PATIENT-LVL III: ICD-10-PCS | Mod: PBBFAC,,, | Performed by: OTOLARYNGOLOGY

## 2022-11-22 PROCEDURE — 1160F RVW MEDS BY RX/DR IN RCRD: CPT | Mod: CPTII,S$GLB,, | Performed by: OTOLARYNGOLOGY

## 2022-11-22 NOTE — PROGRESS NOTES
Subjective:       Patient ID: Rogers Walters is a 32 y.o. male.    Chief Complaint: ETD     Rogers is here for follow-up of patulous eustachian tube.   He is here for re-evaluation. He would like his nasal cavity examined today.   He feels his symptoms are predominantly on the left side but when he treats the left, it will make the right more symptomatic.   One thing recently he has noted is that symptoms on the left will correlate with tension in the occipital region.   He feels that the cartilage implant has migrated over time and will frequently give him trouble with discomfort and sensation of fullness in the ear.     Previously seen in our system by Dr. Green in 2020.  HPI in 2020.    He relates an exhaustive history of classical patulous ETD symptoms that began nearly 10 years ago of unclear origin.  He notes that this has always been worse on the left side, though over time the right ear has become variable involved.  He describes initial treatment by Dr. Brandon Millan, who performed a cheryl insertion in both Eustachian tubes over 8 years ago which was temporarily successful but marred by persistent aural fullness.  After treatment with myringotomy the shims were eventually removed.  He then came under the care of Dr. Roby Short in [place], who again performed a cheryl procedure with some adjunctive manipulation in the left ear, but the beneficial results again were transient.  Along the way he has tried topical medical treatments with Patulend and premarin drops.  Most recently he has been under the care of Dr. Kong Sales in [place], who about 2 years ago performed a cartilage graft insertion in the left peritubular space, which helped somewhat.  This seemed to have the effects of malpositioning and so was treated with attempted crushing by balloon dilation, but the symptoms persisted.  He also underwent reportedly 3 rounds of botox injection in the left tensor veli palatini in an attempt to debulk the  muscle by chemical denervation.  He most recently had an examination under anesthesia by Dr. Sales about 1 month ago, which did not entail intervention.  He has reportedly proposed an endoscopic procedure to adjust the positioning of the cartilage graft, but is seeking a second opinion close to home due to the pandemic.     He continues to describe variable symptoms, which are unpredictable but seem to be more dynamic in nature.  He describes autophony, audible respirations, and aural fullness, greater on the left side.  He notes sporadic fluctuations in the function of the left-sided peritubular muscles, which he associates temporally with muscle tension in his posterior neck.  He denies TMJ tenderness, clicking, trismus or grinding.  He denies objective hearing loss or vertigo.  He denies sinonasal symptoms or hyposmia.  He does note that over the past 1-2 months his aural symptoms are showing a gradual trend toward improvement, though this is subtle.     Current sinonasal medications include none at present.  The last course of antibiotics was a long time ago.  He does not regularly use nasal decongestant sprays.    Patient validated questionnaires (if applicable):      %       No flowsheet data found.  No flowsheet data found.  No flowsheet data found.         Review of Systems   Constitutional: Negative for activity change and appetite change.   Respiratory: Negative for difficulty breathing and wheezing   Cardiovascular: Negative for chest pain.      Objective:      Physical Exam      Tests / Results:  Name: Rogers Walters     Pre-procedure diagnosis: Patulous eustachian tube of both ears [H69.03]  Post-procedure diagnosis: Same    Procedure: Bilateral nasal endoscopy  Anesthesia:  4% Lidocaine and 0.25% Phenylephrine Topical    Indication: This procedure is indicated as anterior rhinoscopy is not sufficient to account for all of the patients symptoms.     Procedure: Risks, benefits, and alternatives of the  procedure were discussed with the patient, and consent was obtained to perform a nasal endoscopy.  The nasal cavity was sprayed with a topical decongestant and topical anesthetic. After adequate anesthesia was obtained, the scope was passed into each nostril independently.  The nasal cavities (including inferior turbinates, middle turbinates, inferior meatus, middle meatus, superior meatus) nasopharynx, choana, eustachian tube, fossa of Rosenmüller, and adenoids were examined. All findings were normal with exception of description below. At the end of the examination, the scope was removed. The patient tolerated the procedure well with no complications.     LEFT: mild anterior septal deviation. Rhinitis changes. Loss of volume in the torus and atrophic fossa consistent with PET. No visible cartilage bulge.   RIGHT: Loss of volume in the torus and atrophic fossa symmetric with other side      Assessment:       1. Patulous eustachian tube of both ears          Plan:         We discussed patulous ET.  Has had numerous surgical treatments by the foremost experts. Right better but Left still symptomatic. No options available that I can offer. No extrusion / migration of cartilage seen.   FU with Dr. Short in [place] to discuss other options.